# Patient Record
Sex: MALE | Race: WHITE | NOT HISPANIC OR LATINO | Employment: FULL TIME | URBAN - METROPOLITAN AREA
[De-identification: names, ages, dates, MRNs, and addresses within clinical notes are randomized per-mention and may not be internally consistent; named-entity substitution may affect disease eponyms.]

---

## 2020-11-20 ENCOUNTER — NURSE TRIAGE (OUTPATIENT)
Dept: OTHER | Facility: OTHER | Age: 50
End: 2020-11-20

## 2020-11-20 DIAGNOSIS — Z20.822 EXPOSURE TO COVID-19 VIRUS: Primary | ICD-10-CM

## 2020-11-20 DIAGNOSIS — Z20.822 EXPOSURE TO COVID-19 VIRUS: ICD-10-CM

## 2020-11-20 PROCEDURE — U0003 INFECTIOUS AGENT DETECTION BY NUCLEIC ACID (DNA OR RNA); SEVERE ACUTE RESPIRATORY SYNDROME CORONAVIRUS 2 (SARS-COV-2) (CORONAVIRUS DISEASE [COVID-19]), AMPLIFIED PROBE TECHNIQUE, MAKING USE OF HIGH THROUGHPUT TECHNOLOGIES AS DESCRIBED BY CMS-2020-01-R: HCPCS | Performed by: FAMILY MEDICINE

## 2020-11-22 LAB — SARS-COV-2 RNA SPEC QL NAA+PROBE: NOT DETECTED

## 2021-03-31 ENCOUNTER — TRANSCRIBE ORDERS (OUTPATIENT)
Dept: ADMINISTRATIVE | Facility: HOSPITAL | Age: 51
End: 2021-03-31

## 2021-03-31 ENCOUNTER — HOSPITAL ENCOUNTER (OUTPATIENT)
Dept: RADIOLOGY | Facility: HOSPITAL | Age: 51
Discharge: HOME | End: 2021-03-31
Payer: COMMERCIAL

## 2021-03-31 DIAGNOSIS — M54.12 BRACHIAL RADICULITIS: Primary | ICD-10-CM

## 2021-03-31 DIAGNOSIS — M54.12 BRACHIAL RADICULITIS: ICD-10-CM

## 2021-03-31 PROCEDURE — 72050 X-RAY EXAM NECK SPINE 4/5VWS: CPT

## 2021-04-08 DIAGNOSIS — Z23 ENCOUNTER FOR IMMUNIZATION: ICD-10-CM

## 2021-04-26 ENCOUNTER — TRANSCRIBE ORDERS (OUTPATIENT)
Dept: PHYSICAL THERAPY | Facility: MEDICAL CENTER | Age: 51
End: 2021-04-26

## 2021-04-26 ENCOUNTER — EVALUATION (OUTPATIENT)
Dept: PHYSICAL THERAPY | Facility: CLINIC | Age: 51
End: 2021-04-26
Payer: COMMERCIAL

## 2021-04-26 DIAGNOSIS — M54.12 CERVICAL RADICULOPATHY: Primary | ICD-10-CM

## 2021-04-26 PROCEDURE — 97161 PT EVAL LOW COMPLEX 20 MIN: CPT | Performed by: PHYSICAL THERAPIST
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
## 2021-05-06 ENCOUNTER — OFFICE VISIT (OUTPATIENT)
Dept: PHYSICAL THERAPY | Facility: CLINIC | Age: 51
End: 2021-05-06
Payer: COMMERCIAL

## 2021-05-06 DIAGNOSIS — M54.12 CERVICAL RADICULOPATHY: Primary | ICD-10-CM

## 2021-05-06 PROCEDURE — 97140 MANUAL THERAPY 1/> REGIONS: CPT | Performed by: PHYSICAL THERAPIST

## 2021-05-06 PROCEDURE — 97110 THERAPEUTIC EXERCISES: CPT | Performed by: PHYSICAL THERAPIST

## 2021-05-06 PROCEDURE — 97012 MECHANICAL TRACTION THERAPY: CPT | Performed by: PHYSICAL THERAPIST

## 2021-05-06 NOTE — PROGRESS NOTES
Daily Note     Today's date: 2021  Patient name: Sean De Paz  : 1970  MRN: [id number]  Referring provider: Kathy Castillo  Dx:   Encounter Diagnosis     ICD-10-CM    1  Cervical radiculopathy  M54 12                   Subjective: Pt reports 2/10 pain levels in cervical spine  Pt had increased tingling when at the dentist office today  Numbness radiates down to his hand/fingers      Objective: See treatment diary below      Assessment: Tolerated treatment well  Patient exhibited good technique with therapeutic exercises      Plan: Continue per plan of care   Trial of cervical traction performed     Precautions: n/a      Manuals  56           Upper t/s PA mobs nv th           Cervical PA/ UPA  th           Cervical distraction  th           Cervical MFR  th             15'           Neuro Re-Ed             Supine DNF nv            scap retractions nv            Prone rows nv 10           Prone scapular retraction  10x10"           Prone T  10           Prone W  10                        Ther Ex             UBE nv 2'/2'           T/s extension stretch nv 10           L upper trap stretch  3x20"           Cervical rotation SNAG  x5 B                                                  HEP instruction and performance x5'            Ther Activity                                       Gait Training                                       Modalities             Saunder's unit cervical traction  5' x1

## 2021-05-20 ENCOUNTER — OFFICE VISIT (OUTPATIENT)
Dept: PHYSICAL THERAPY | Facility: CLINIC | Age: 51
End: 2021-05-20
Payer: COMMERCIAL

## 2021-05-20 DIAGNOSIS — M54.12 CERVICAL RADICULOPATHY: Primary | ICD-10-CM

## 2021-05-20 PROCEDURE — 97140 MANUAL THERAPY 1/> REGIONS: CPT

## 2021-05-20 PROCEDURE — 97110 THERAPEUTIC EXERCISES: CPT

## 2021-05-20 NOTE — PROGRESS NOTES
Daily Note     Today's date: 2021  Patient name: Jeremy Syed  : 1970  MRN: [id number]  Referring provider: Bonny Helms  Dx:   Encounter Diagnosis     ICD-10-CM    1  Cervical radiculopathy  M54 12        Start Time:   Stop Time:   Total time in clinic (min): 35 minutes    Subjective: Patient states, "I felt worse after last visit  However, my girlfriend bought me this massager and it really helped  Only had symptoms down my arm like 9 times vs 120 after therapy"  Objective: See treatment diary below    Assessment: Tolerated treatment well  Patient exhibited good technique with therapeutic exercises  Good response to STM at home and here at therapy  No symptoms or trigger points post therapy  Plan: Continue per plan of care        Precautions: n/a    Manuals           Upper t/s PA mobs nv th           Cervical PA/ UPA  th           Cervical distraction  th           Cervical MFR  th JM  +scap            15'           Neuro Re-Ed             Supine DNF nv            scap retractions nv            Prone rows nv 10           Prone scapular retraction  10x10" 10  x10"          Prone T  10 10          Prone W  10 10                       Ther Ex             UBE nv 2'/2' 2'/2'          T/s extension stretch nv 10           L upper trap stretch  3x20"           Cervical rotation SNAG  x5 B                                                  HEP instruction and performance x5'            Ther Activity                                       Gait Training                                       Modalities             Saunder's unit cervical traction  5' x1

## 2021-05-27 ENCOUNTER — OFFICE VISIT (OUTPATIENT)
Dept: PHYSICAL THERAPY | Facility: CLINIC | Age: 51
End: 2021-05-27
Payer: COMMERCIAL

## 2021-05-27 DIAGNOSIS — M54.12 CERVICAL RADICULOPATHY: Primary | ICD-10-CM

## 2021-05-27 PROCEDURE — 97110 THERAPEUTIC EXERCISES: CPT

## 2021-05-27 PROCEDURE — 97140 MANUAL THERAPY 1/> REGIONS: CPT

## 2021-05-27 NOTE — PROGRESS NOTES
Daily Note     Today's date: 2021  Patient name: Beth Tapia  : 1970  MRN: [id number]  Referring provider: Jose Lara  Dx:   Encounter Diagnosis     ICD-10-CM    1  Cervical radiculopathy  M54 12        Start Time: 702  Stop Time:   Total time in clinic (min): 30 minutes    Subjective: Patient notes minimal symptoms t/o the day  Still has one spot that bothers him (points to mid trap area)    Objective: See treatment diary below    Assessment: Able to decrease muscular tone of the middle trap at the superior medial boarder at the scapula  Overall improving secondary to TPR and postural TE  Plan: Continue per plan of care        Precautions: n/a    Manuals          Upper t/s PA mobs nv th           Cervical PA/ UPA  th           Cervical distraction  th           Cervical MFR  th JM  +scap JM  +scap           15'  15'         Neuro Re-Ed             Supine DNF nv            scap retractions nv            Prone rows nv 10           Prone scapular retraction  10x10" 10  x10" 10  x10"         Prone T  10 10 10         Prone W  10 10 10                      Ther Ex             UBE nv 2'/2' 2'/2' 2'/2'         T/s extension stretch nv 10           L upper trap stretch  3x20"           Cervical rotation SNAG  x5 B                                                  HEP instruction and performance x5'            Ther Activity                                       Gait Training                                       Modalities             Saunder's unit cervical traction  5' x1